# Patient Record
Sex: FEMALE | Race: WHITE | NOT HISPANIC OR LATINO | Employment: FULL TIME
[De-identification: names, ages, dates, MRNs, and addresses within clinical notes are randomized per-mention and may not be internally consistent; named-entity substitution may affect disease eponyms.]

---

## 2017-10-15 ENCOUNTER — HEALTH MAINTENANCE LETTER (OUTPATIENT)
Age: 42
End: 2017-10-15

## 2024-03-12 ENCOUNTER — HOSPITAL ENCOUNTER (EMERGENCY)
Facility: CLINIC | Age: 49
Discharge: PSYCHIATRIC OR SUBSTANCE USE FACILITY | End: 2024-03-13
Attending: EMERGENCY MEDICINE | Admitting: EMERGENCY MEDICINE
Payer: COMMERCIAL

## 2024-03-12 ENCOUNTER — TELEPHONE (OUTPATIENT)
Dept: BEHAVIORAL HEALTH | Facility: CLINIC | Age: 49
End: 2024-03-12

## 2024-03-12 DIAGNOSIS — E03.9 HYPOTHYROIDISM, UNSPECIFIED TYPE: ICD-10-CM

## 2024-03-12 DIAGNOSIS — F23 ACUTE PSYCHOSIS (H): ICD-10-CM

## 2024-03-12 PROBLEM — F31.9 BIPOLAR DISORDER (H): Status: ACTIVE | Noted: 2024-03-12

## 2024-03-12 LAB
ACANTHOCYTES BLD QL SMEAR: ABNORMAL
ALBUMIN SERPL BCG-MCNC: 3.9 G/DL (ref 3.5–5.2)
ALBUMIN UR-MCNC: NEGATIVE MG/DL
ALP SERPL-CCNC: 91 U/L (ref 40–150)
ALT SERPL W P-5'-P-CCNC: 12 U/L (ref 0–50)
AMPHETAMINES UR QL SCN: ABNORMAL
ANION GAP SERPL CALCULATED.3IONS-SCNC: 10 MMOL/L (ref 7–15)
APPEARANCE UR: CLEAR
AST SERPL W P-5'-P-CCNC: 35 U/L (ref 0–45)
AUER BODIES BLD QL SMEAR: ABNORMAL
BACTERIA #/AREA URNS HPF: ABNORMAL /HPF
BARBITURATES UR QL SCN: ABNORMAL
BASO STIPL BLD QL SMEAR: ABNORMAL
BASOPHILS # BLD AUTO: 0.1 10E3/UL (ref 0–0.2)
BASOPHILS NFR BLD AUTO: 1 %
BENZODIAZ UR QL SCN: ABNORMAL
BILIRUB SERPL-MCNC: 0.3 MG/DL
BILIRUB UR QL STRIP: NEGATIVE
BITE CELLS BLD QL SMEAR: ABNORMAL
BLISTER CELLS BLD QL SMEAR: ABNORMAL
BUN SERPL-MCNC: 10.2 MG/DL (ref 6–20)
BURR CELLS BLD QL SMEAR: ABNORMAL
BZE UR QL SCN: ABNORMAL
CALCIUM SERPL-MCNC: 9.1 MG/DL (ref 8.6–10)
CANNABINOIDS UR QL SCN: ABNORMAL
CHLORIDE SERPL-SCNC: 101 MMOL/L (ref 98–107)
COLOR UR AUTO: YELLOW
CREAT SERPL-MCNC: 1.14 MG/DL (ref 0.51–0.95)
DACRYOCYTES BLD QL SMEAR: SLIGHT
DEPRECATED HCO3 PLAS-SCNC: 26 MMOL/L (ref 22–29)
EGFRCR SERPLBLD CKD-EPI 2021: 59 ML/MIN/1.73M2
ELLIPTOCYTES BLD QL SMEAR: ABNORMAL
EOSINOPHIL # BLD AUTO: 0.3 10E3/UL (ref 0–0.7)
EOSINOPHIL NFR BLD AUTO: 2 %
ERYTHROCYTE [DISTWIDTH] IN BLOOD BY AUTOMATED COUNT: 19.9 % (ref 10–15)
FENTANYL UR QL: ABNORMAL
FRAGMENTS BLD QL SMEAR: ABNORMAL
GIANT PLATELETS BLD QL SMEAR: ABNORMAL
GLUCOSE SERPL-MCNC: 72 MG/DL (ref 70–99)
GLUCOSE UR STRIP-MCNC: NEGATIVE MG/DL
HCT VFR BLD AUTO: 33.1 % (ref 35–47)
HGB BLD-MCNC: 9.9 G/DL (ref 11.7–15.7)
HGB C CRYSTALS: ABNORMAL
HGB UR QL STRIP: NEGATIVE
HOWELL-JOLLY BOD BLD QL SMEAR: ABNORMAL
HYALINE CASTS: 1 /LPF
IMM GRANULOCYTES # BLD: 0 10E3/UL
IMM GRANULOCYTES NFR BLD: 0 %
KETONES UR STRIP-MCNC: NEGATIVE MG/DL
LEUKOCYTE ESTERASE UR QL STRIP: NEGATIVE
LYMPHOCYTES # BLD AUTO: 3.2 10E3/UL (ref 0–5.3)
LYMPHOCYTES NFR BLD AUTO: 28 %
MCH RBC QN AUTO: 24.9 PG (ref 26.5–33)
MCHC RBC AUTO-ENTMCNC: 29.9 G/DL (ref 31.5–36.5)
MCV RBC AUTO: 83 FL (ref 78–100)
MONOCYTES # BLD AUTO: 0.6 10E3/UL (ref 0–1.3)
MONOCYTES NFR BLD AUTO: 5 %
MUCOUS THREADS #/AREA URNS LPF: PRESENT /LPF
NEUTROPHILS # BLD AUTO: 7.3 10E3/UL (ref 1.6–8.3)
NEUTROPHILS NFR BLD AUTO: 64 %
NEUTS HYPERSEG BLD QL SMEAR: ABNORMAL
NITRATE UR QL: NEGATIVE
NRBC # BLD AUTO: 0 10E3/UL
NRBC BLD AUTO-RTO: 0 /100
OPIATES UR QL SCN: ABNORMAL
PATH REV: ABNORMAL
PCP QUAL URINE (ROCHE): ABNORMAL
PH UR STRIP: 6 [PH] (ref 5–7)
PLAT MORPH BLD: ABNORMAL
PLATELET # BLD AUTO: 290 10E3/UL (ref 150–450)
POLYCHROMASIA BLD QL SMEAR: ABNORMAL
POTASSIUM SERPL-SCNC: 3.8 MMOL/L (ref 3.4–5.3)
PROT SERPL-MCNC: 9.3 G/DL (ref 6.4–8.3)
RBC # BLD AUTO: 3.97 10E6/UL (ref 3.8–5.2)
RBC AGGLUT BLD QL: ABNORMAL
RBC MORPH BLD: ABNORMAL
RBC URINE: <1 /HPF
ROULEAUX BLD QL SMEAR: ABNORMAL
SARS-COV-2 RNA RESP QL NAA+PROBE: NEGATIVE
SICKLE CELLS BLD QL SMEAR: ABNORMAL
SMUDGE CELLS BLD QL SMEAR: ABNORMAL
SODIUM SERPL-SCNC: 137 MMOL/L (ref 135–145)
SP GR UR STRIP: 1.01 (ref 1–1.03)
SPHEROCYTES BLD QL SMEAR: ABNORMAL
SQUAMOUS EPITHELIAL: 4 /HPF
STOMATOCYTES BLD QL SMEAR: ABNORMAL
T4 FREE SERPL-MCNC: 0.21 NG/DL (ref 0.9–1.7)
TARGETS BLD QL SMEAR: ABNORMAL
TOXIC GRANULES BLD QL SMEAR: ABNORMAL
TSH SERPL DL<=0.005 MIU/L-ACNC: 231.9 UIU/ML (ref 0.3–4.2)
UROBILINOGEN UR STRIP-MCNC: 4 MG/DL
VARIANT LYMPHS BLD QL SMEAR: ABNORMAL
WBC # BLD AUTO: 11.5 10E3/UL (ref 4–11)
WBC URINE: 1 /HPF

## 2024-03-12 PROCEDURE — 36415 COLL VENOUS BLD VENIPUNCTURE: CPT | Performed by: EMERGENCY MEDICINE

## 2024-03-12 PROCEDURE — 80307 DRUG TEST PRSMV CHEM ANLYZR: CPT | Performed by: EMERGENCY MEDICINE

## 2024-03-12 PROCEDURE — 85004 AUTOMATED DIFF WBC COUNT: CPT | Performed by: EMERGENCY MEDICINE

## 2024-03-12 PROCEDURE — 99285 EMERGENCY DEPT VISIT HI MDM: CPT | Performed by: EMERGENCY MEDICINE

## 2024-03-12 PROCEDURE — 84439 ASSAY OF FREE THYROXINE: CPT | Performed by: EMERGENCY MEDICINE

## 2024-03-12 PROCEDURE — 87635 SARS-COV-2 COVID-19 AMP PRB: CPT | Performed by: EMERGENCY MEDICINE

## 2024-03-12 PROCEDURE — 80053 COMPREHEN METABOLIC PANEL: CPT | Performed by: EMERGENCY MEDICINE

## 2024-03-12 PROCEDURE — 84443 ASSAY THYROID STIM HORMONE: CPT | Performed by: EMERGENCY MEDICINE

## 2024-03-12 PROCEDURE — 250N000013 HC RX MED GY IP 250 OP 250 PS 637: Performed by: EMERGENCY MEDICINE

## 2024-03-12 PROCEDURE — 81001 URINALYSIS AUTO W/SCOPE: CPT | Mod: XU | Performed by: EMERGENCY MEDICINE

## 2024-03-12 RX ORDER — LORAZEPAM 1 MG/1
2 TABLET ORAL EVERY 8 HOURS PRN
Status: DISCONTINUED | OUTPATIENT
Start: 2024-03-12 | End: 2024-03-14 | Stop reason: HOSPADM

## 2024-03-12 RX ORDER — LEVOTHYROXINE SODIUM 150 UG/1
150 TABLET ORAL DAILY
COMMUNITY
Start: 2024-03-10 | End: 2025-03-10

## 2024-03-12 RX ORDER — LEVOTHYROXINE SODIUM 75 UG/1
225 TABLET ORAL
Status: DISCONTINUED | OUTPATIENT
Start: 2024-03-13 | End: 2024-03-14 | Stop reason: HOSPADM

## 2024-03-12 RX ORDER — LEVOTHYROXINE SODIUM 75 UG/1
150 TABLET ORAL ONCE
Status: COMPLETED | OUTPATIENT
Start: 2024-03-12 | End: 2024-03-12

## 2024-03-12 RX ORDER — LEVOTHYROXINE SODIUM 75 UG/1
150 TABLET ORAL
Status: DISCONTINUED | OUTPATIENT
Start: 2024-03-13 | End: 2024-03-12

## 2024-03-12 RX ORDER — OLANZAPINE 5 MG/1
10 TABLET, ORALLY DISINTEGRATING ORAL 2 TIMES DAILY PRN
Status: DISCONTINUED | OUTPATIENT
Start: 2024-03-12 | End: 2024-03-14 | Stop reason: HOSPADM

## 2024-03-12 RX ADMIN — AMOXICILLIN AND CLAVULANATE POTASSIUM 1 TABLET: 875; 125 TABLET, COATED ORAL at 15:35

## 2024-03-12 RX ADMIN — LEVOTHYROXINE SODIUM 150 MCG: 75 TABLET ORAL at 15:35

## 2024-03-12 ASSESSMENT — ACTIVITIES OF DAILY LIVING (ADL)
ADLS_ACUITY_SCORE: 35
ADLS_ACUITY_SCORE: 33
ADLS_ACUITY_SCORE: 35

## 2024-03-12 ASSESSMENT — COLUMBIA-SUICIDE SEVERITY RATING SCALE - C-SSRS
2. HAVE YOU ACTUALLY HAD ANY THOUGHTS OF KILLING YOURSELF IN THE PAST MONTH?: NO
1. IN THE PAST MONTH, HAVE YOU WISHED YOU WERE DEAD OR WISHED YOU COULD GO TO SLEEP AND NOT WAKE UP?: NO
6. HAVE YOU EVER DONE ANYTHING, STARTED TO DO ANYTHING, OR PREPARED TO DO ANYTHING TO END YOUR LIFE?: NO

## 2024-03-12 NOTE — MEDICATION SCRIBE - ADMISSION MEDICATION HISTORY
Medication Scribe Admission Medication History    Admission medication history is complete. The information provided in this note is only as accurate as the sources available at the time of the update.    Information Source(s): Patient, Patient's pharmacy, and CareEverywhere/[employer]Scripts via in-person    Pertinent Information: patient just picked up new dose of  levothyroxine yesterday, took one this morning. Was on 200mcg + 25mcg daily last dispensed 01/05/2024 90/90 days on both. Verified with shannan Perdomo at [place], [place]    Changes made to PTA medication list:  Added: augmentin, levothyroxine  Deleted: None  Changed: None    Allergies reviewed with patient and updates made in EHR: no    Medication History Completed By: GLORIA BOLAND 3/12/2024 1:45 PM    PTA Med List   Medication Sig Note Last Dose    amoxicillin-clavulanate (AUGMENTIN) 875-125 MG tablet Take 1 tablet by mouth 2 times daily Cat bite  3/11/2024 at pm    levothyroxine (SYNTHROID/LEVOTHROID) 150 MCG tablet Take 150 mcg by mouth daily 3/12/2024: Just restarted 03/11/2024 3/12/2024 at am

## 2024-03-12 NOTE — PLAN OF CARE
Zoya RAMONE Rekha Arechiga  March 12, 2024  Plan of Care Hand-off Note     Patient Care Path: inpatient mental health    Plan for Care:   Pt presents to ED against her will due to family's concern of altered mental status.  For past week, family notes pt is somewhat confused and having delusional thougths.  Family reports immediate concern for pt's safety and ability to care for self.  Currently, pt is alert, oriented, cooperative with interview.  Pt denies SI or HI with no hx of inpatient hospitalizations.  Pt endorses mild paranoia and delusional thoughts, sadness, change in sleep and appetite. Pt without history of suicide attempts or harming others.   Pt verbalizes willingness for outpatient mental health supports.  Pt able to identify supports, crisis resources.  Pt is goal oriented.  After therapeutic assessment, intervention and aftercare planning by ED care team and LM and in consultation with attending provider, pt is recommended for IP MH due to concerns expressed for pt's immediate safety and inability to care for self by family in addition to pt's acute mental state of paranoia and delusions.  Central Intake contacted.    Identified Goals and Safety Issues: pt requiring IP for safety and stabilization    Overview:  Sister Melissa Muñiz #[phone]            Legal Status:      Psychiatry Consult:        Updated attending provider regarding plan of care.           Мария Salazar

## 2024-03-12 NOTE — Clinical Note
I have reviewed discharge instructions with the patient. The patient verbalized understanding. Discharge medications reviewed with patient and appropriate educational materials and side effects teaching were provided. Patient armband removed and shredded Zoya Arechiga was seen and treated in our emergency department on 3/12/2024.  She may return to work on 03/20/2024.  Currently under care of of physician and multidisciplinary specialty team.  Please excuse from any work duties until cleared by this team to return     If you have any questions or concerns, please don't hesitate to call.      Neris Crockett, DO

## 2024-03-12 NOTE — PROGRESS NOTES
Pt is tearful during DEC assessment, and states she is angry post assessment. Offered water, snack or warm blanket; patient states she wants nothing except to go home. Celina Shipley RN on 3/12/2024 at 6:27 PM

## 2024-03-12 NOTE — CONSULTS
"Diagnostic Evaluation Consultation  Crisis Assessment    Patient Name: Zoya Arechiga  Age:  48 year old  Legal Sex: female  Gender Identity: female  Pronouns:   Race: White  Ethnicity: Not  or   Language: English      Patient was assessed: Virtual: [place] Crisis Assessment Start Time: 1643 Crisis Assessment Stop Time: 1743  Patient location: [facility] EMERGENCY DEPT                             ED02    Referral Data and Chief Complaint  Zoya Arechiga presents to the ED via EMS. Patient is presenting to the ED for the following concerns: Paranoia, Worsening psychosocial stress, Significant behavioral change.   Factors that make the mental health crisis life threatening or complex are:  Pt brought to ED due to concerns of altered mental status.  Per family, pt has been confused with erratic behavior.  Pt expressing delusional and paranoid thoughts about drone following her, others listening to her.   Family concerned pt needing prompting to eat.  Pt lives alone, has had episodes of Bipolar in past.  Pt not on medication.   Pt denies SI or HI.  Pt is coopeative with interview, speech is clear.  Thoughts are tangential.  Pt states her family is aggressive and that she is trying to deal with her own personal issues by herself.  Pt notes she became \"ultrasensitive\" after receiving an acupuncture treatment a week ago and now is feeling every muscle and fiber in her body and \"I have a clarity I've never had\".  Pt endorses sadness, some feelings of hopelessness.  Pt reports decrease in sleep and appetite..      Informed Consent and Assessment Methods  Explained the crisis assessment process, including applicable information disclosures and limits to confidentiality, assessed understanding of the process, and obtained consent to proceed with the assessment.  Assessment methods included conducting a formal interview with patient, review of medical records, collaboration with " medical staff, and obtaining relevant collateral information from family and community providers when available.  : done     Patient response to interventions: acceptance expressed  Coping skills were attempted to reduce the crisis:  Pt feeling forced to come to hospital against her will, states she was going to reach out to a primary doctor     History of the Crisis   Pt reports 3 prior episodes of similar symptoms.  Pt has been to ER for evaluations in past and placed on72 hour hold. Pt denies prior hospitalizations.  Family brought pt to ER in [place] over weekend but pt was discharged after refusing to stay voluntarily.    Brief Psychosocial History  Family:  Single, Children yes  Support System:  Sibling(s), Children  Employment Status:  disabled  Source of Income:  salary/wages  Financial Environmental Concerns:  other (see comments) (unable to fully assess)  Current Hobbies:  interaction with pets  Barriers in Personal Life:  mental health concerns    Significant Clinical History  Current Anxiety Symptoms:  anxious  Current Depression/Trauma:  difficulty concentrating, impaired decision making, irritable, hopelessness, sadness  Current Somatic Symptoms:  anxious  Current Psychosis/Thought Disturbance:  agitation, tactile hallucinations  Current Eating Symptoms:  loss of appetite  Chemical Use History:  Alcohol: None  Benzodiazepines: None  Opiates: None  Cocaine: None  Marijuana: Occasional   Past diagnosis:  Bipolar Disorder  Family history:  Depression  Past treatment:  Individual therapy  Details of most recent treatment:  Pt is unsure or unwilling to share if has been on psychiatric meds in past  Other relevant history:          Collateral Information  Is there collateral information: Yes     Collateral information name, relationship, phone number:  Melissa Muñiz  [phone]    What happened today: think sister is in a Manic phase since last week, behavior:  needing redirection for simple tasks like  "eating, talking of ex-boyfriend's son who has been trained in drone survellience afraid she is being watched/listened to, telling others to not speak when phone is by, complaining of experiencing sensations like \"heaviness in foot\".     What is different about patient's functioning: confusion, behavior change,  was in ER in [facility] over weekend for assmt but discharged.     Concern about alcohol/drug use:      What do you think the patient needs:      Has patient made comments about wanting to kill themselves/others: no    If d/c is recommended, can they take part in safety/aftercare planning:  yes    Additional collateral information:  3 years homeless, first incident age 17 - thought she was [employer] agent, age 21 - police called and child put in CPS, over weekend was assessed in [place] but discharged.  Stable for past 18 months working.  Adult son brought to see her past week, it did not go well, she was able to recognize and assist him in finding housing.  Uncle and cousin with mental health issues.  Ocassional use of marijuana.     Risk Assessment  [place] Suicide Severity Rating Scale Full Clinical Version:  Suicidal Ideation  Q6 Suicide Behavior (Lifetime): no          [place] Suicide Severity Rating Scale Recent:   Suicidal Ideation (Recent)  Q1 Wished to be Dead (Past Month): no  Q2 Suicidal Thoughts (Past Month): no  Level of Risk per Screen: no risks indicated          Environmental or Psychosocial Events: other life stressors, work or task failure  Protective Factors: Protective Factors: responsibilities and duties to others, including pets and children, reality testing ability    Does the patient have thoughts of harming others? Feels Like Hurting Others: no  Previous Attempt to Hurt Others: no  Current presentation: Confused  Is the patient engaging in sexually inappropriate behavior?: no  Duty to warn initiated: no    Is the patient engaging in sexually inappropriate behavior?  no        Mental Status Exam "   Affect: Appropriate  Appearance: Disheveled  Attention Span/Concentration: Attentive  Eye Contact: Variable    Fund of Knowledge: Appropriate   Language /Speech Content: Fluent  Language /Speech Volume: Normal  Language /Speech Rate/Productions: Normal  Recent Memory: Intact  Remote Memory: Intact  Mood: Irritable, Depressed  Orientation to Person: Yes   Orientation to Place: Yes  Orientation to Time of Day: Yes  Orientation to Date: Yes     Situation (Do they understand why they are here?): Yes  Psychomotor Behavior: Normal  Thought Content: Delusions, Paranoia  Thought Form: Paranoia, Tangential     Mini-Cog Assessment  Number of Words Recalled:    Clock-Drawing Test:     Three Item Recall:    Mini-Cog Total Score:       Medication  Psychotropic medications:   Medication Orders - Psychiatric (From admission, onward)      None             Current Care Team  Patient Care Team:  No Ref-Primary, Physician as PCP - General    Diagnosis  Patient Active Problem List   Diagnosis Code    Bipolar disorder (H) F31.9       Primary Problem This Admission  Active Hospital Problems    *Bipolar disorder (H)        Clinical Summary and Substantiation of Recommendations   Pt presents to ED against her will due to family's concern of altered mental status.  For past week, family notes pt is somewhat confused and having delusional thougths.  Family reports immediate concern for pt's safety and ability to care for self.  Currently, pt is alert, oriented, cooperative with interview.  Pt denies SI or HI with no hx of inpatient hospitalizations.  Pt endorses mild paranoia and delusional thoughts, sadness, change in sleep and appetite. Pt without history of suicide attempts or harming others.   Pt verbalizes willingness for outpatient mental health supports.  Pt able to identify supports, crisis resources.  Pt is goal oriented.  After therapeutic assessment, intervention and aftercare planning by ED care team and [facility] and in  consultation with attending provider, pt is recommended for [facility] due to concerns expressed for pt's immediate safety and inability to care for self by family in addition to pt's acute mental state of paranoia and delusions.  Central Intake contacted.          Severe psychiatric, behavioral or other comorbid conditions are appropriate for management at inpatient mental health as indicated by at least one of the following: Psychiatric Symptoms, Impaired impulse control, judgement, or insight  Severe dysfunction in daily living is present as indicated by at least one of the following: Complete inability to maintain any appropriate aspect of personal responsibility in any adult roles, Other evidence of severe dysfunction  Situation and expectations are appropriate for inpatient care: Patient is unwilling to participate in treatment voluntarily and requires treatment  Inpatient mental health services are necessary to meet patient needs and at least one of the following: Specific condition related to admission diagnosis is present and judged likely to further improve at proposed level of care      Patient coping skills attempted to reduce the crisis:  Pt feeling forced to come to hospital against her will, states she was going to reach out to a primary doctor    Disposition  Recommended disposition: Individual Therapy, Medication Management        Reviewed case and recommendations with attending provider. Attending Name: Dr Ordonez       Attending concurs with disposition: no       Patient and/or validated legal guardian concurs with disposition:   no       Final disposition:  inpatient mental health    Legal status on admission:      Assessment Details   Total duration spent with the patient: 60 min     CPT code(s) utilized: 80722 - Psychotherapy for Crisis - 60 (30-74*) min    Мария Salazar Psychotherapist  DEC - Triage & Transition Services  Callback: [phone]

## 2024-03-12 NOTE — ED PROVIDER NOTES
History     Chief Complaint   Patient presents with    Altered Mental Status     HPI  Zoya Arechiga is a 48 year old female who is brought in here by her sister and daughter.  I had an independent interview with the sister who stated she is delusional and psychotic.  Sister reports she has a diagnosis of bipolar disorder but refuses to take any medications.  Also does not feel she is taking any drugs or alcohol.  She apparently lives in a trailer in [place] with no water.  Over the last week, sister reports she was out walking around the trailer area picking things from neighbors yard and putting them on [place].  She was brought into the hospital in [place] 2 days ago.  Her thyroid apparently was off the chart as she had not been taking her thyroid replacement medication.  They replaced this in [place] to a voluntary psych unit for which she refused to go inside.  She lives in [facility].  She then drove to her daughters here locally.  Last night she was tangential, incoherent and delusional.  She stated people were listening to her.   they feel she is unsafe.  They called the police to have her brought into the car.  Police did not touch her but help escort her into the car.  Upon getting here, they were fearful she would run into traffic till she eventually came in here.  Patient denies she is having trouble and states she is just having a midlife crisis      Allergies:  Allergies   Allergen Reactions    Azithromycin Hives       Problem List:    Patient Active Problem List    Diagnosis Date Noted    Bipolar disorder (H) 03/12/2024     Priority: Medium        Past Medical History:    No past medical history on file.    Past Surgical History:    No past surgical history on file.    Family History:    No family history on file.    Social History:  Marital Status:  Single [1]        Medications:    amoxicillin-clavulanate (AUGMENTIN) 875-125 MG tablet  levothyroxine (SYNTHROID/LEVOTHROID) 150 MCG  tablet          Review of Systems  Scratch to her left arm from a cat tooth that is being treated with antibiotics.  No other injury or medical concern reported than the thyroid condition  Physical Exam   BP: (!) 120/90  Pulse: 91  Temp: 97.7  F (36.5  C)  Resp: 20  Weight: 116.6 kg (257 lb)  SpO2: 99 %      Physical Exam  Vitals and nursing note reviewed.   Constitutional:       General: She is not in acute distress.     Appearance: She is well-developed. She is not diaphoretic.   HENT:      Head: Normocephalic and atraumatic.      Nose: Nose normal.      Mouth/Throat:      Mouth: Mucous membranes are moist.      Pharynx: Oropharynx is clear.   Eyes:      General: No scleral icterus.     Conjunctiva/sclera: Conjunctivae normal.   Cardiovascular:      Rate and Rhythm: Normal rate and regular rhythm.      Heart sounds: Normal heart sounds. No murmur heard.  Pulmonary:      Effort: Pulmonary effort is normal. No respiratory distress.      Breath sounds: No stridor. No wheezing or rales.   Abdominal:      General: Abdomen is flat. There is no distension.      Palpations: Abdomen is soft. There is no mass.      Tenderness: There is no abdominal tenderness. There is no guarding or rebound.   Musculoskeletal:         General: No tenderness.      Cervical back: Normal range of motion and neck supple.   Skin:     General: Skin is warm and dry.      Coloration: Skin is not pale.      Findings: No erythema or rash.   Neurological:      General: No focal deficit present.      Mental Status: She is alert.   Psychiatric:         Mood and Affect: Mood normal. Affect is blunt.         Speech: Speech is tangential.         Thought Content: Thought content does not include homicidal or suicidal ideation.         Cognition and Memory: Cognition is impaired.         ED Course        Procedures                Results for orders placed or performed during the hospital encounter of 03/12/24 (from the past 24 hour(s))   CBC with platelets  differential    Narrative    The following orders were created for panel order CBC with platelets differential.  Procedure                               Abnormality         Status                     ---------                               -----------         ------                     CBC with platelets and d...[[id number]]  Abnormal            Final result               RBC and Platelet Morphology[[id number]]  Abnormal            Final result                 Please view results for these tests on the individual orders.   Comprehensive metabolic panel   Result Value Ref Range    Sodium 137 135 - 145 mmol/L    Potassium 3.8 3.4 - 5.3 mmol/L    Carbon Dioxide (CO2) 26 22 - 29 mmol/L    Anion Gap 10 7 - 15 mmol/L    Urea Nitrogen 10.2 6.0 - 20.0 mg/dL    Creatinine 1.14 (H) 0.51 - 0.95 mg/dL    GFR Estimate 59 (L) >60 mL/min/1.73m2    Calcium 9.1 8.6 - 10.0 mg/dL    Chloride 101 98 - 107 mmol/L    Glucose 72 70 - 99 mg/dL    Alkaline Phosphatase 91 40 - 150 U/L    AST 35 0 - 45 U/L    ALT 12 0 - 50 U/L    Protein Total 9.3 (H) 6.4 - 8.3 g/dL    Albumin 3.9 3.5 - 5.2 g/dL    Bilirubin Total 0.3 <=1.2 mg/dL   TSH with free T4 reflex   Result Value Ref Range    .90 (H) 0.30 - 4.20 uIU/mL   CBC with platelets and differential   Result Value Ref Range    WBC Count 11.5 (H) 4.0 - 11.0 10e3/uL    RBC Count 3.97 3.80 - 5.20 10e6/uL    Hemoglobin 9.9 (L) 11.7 - 15.7 g/dL    Hematocrit 33.1 (L) 35.0 - 47.0 %    MCV 83 78 - 100 fL    MCH 24.9 (L) 26.5 - 33.0 pg    MCHC 29.9 (L) 31.5 - 36.5 g/dL    RDW 19.9 (H) 10.0 - 15.0 %    Platelet Count 290 150 - 450 10e3/uL    % Neutrophils 64 %    % Lymphocytes 28 %    % Monocytes 5 %    % Eosinophils 2 %    % Basophils 1 %    % Immature Granulocytes 0 %    NRBCs per 100 WBC 0 <1 /100    Absolute Neutrophils 7.3 1.6 - 8.3 10e3/uL    Absolute Lymphocytes 3.2 0.0 - 5.3 10e3/uL    Absolute Monocytes 0.6 0.0 - 1.3 10e3/uL    Absolute Eosinophils 0.3 0.0 - 0.7 10e3/uL    Absolute  Basophils 0.1 0.0 - 0.2 10e3/uL    Absolute Immature Granulocytes 0.0 <=0.4 10e3/uL    Absolute NRBCs 0.0 10e3/uL   RBC and Platelet Morphology   Result Value Ref Range    RBC Morphology Confirmed RBC Indices     Platelet Assessment  Automated Count Confirmed. Platelet morphology is normal.     Automated Count Confirmed. Platelet morphology is normal.    Giant Platelets      Acanthocytes      Leandro Rods      Basophilic Stippling      Bite Cells      Blister Cells      [facility] Cells      Elliptocytes      Hgb C Crystals      Lawrence-Jolly Bodies      Hypersegmented Neutrophils      Polychromasia      RBC agglutination      RBC Fragments      Reactive Lymphocytes      Rouleaux      Sickle Cells      Smudge Cells      Spherocytes      Stomatocytes      Target Cells      Teardrop Cells Slight (A) None Seen    Toxic Neutrophils      Pathologist Review Comments (Blood)     T4 free   Result Value Ref Range    Free T4 0.21 (L) 0.90 - 1.70 ng/dL   UA with Microscopic reflex to Culture    Specimen: Urine, Clean Catch   Result Value Ref Range    Color Urine Yellow Colorless, Straw, Light Yellow, Yellow    Appearance Urine Clear Clear    Glucose Urine Negative Negative mg/dL    Bilirubin Urine Negative Negative    Ketones Urine Negative Negative mg/dL    Specific Gravity Urine 1.013 1.003 - 1.035    Blood Urine Negative Negative    pH Urine 6.0 5.0 - 7.0    Protein Albumin Urine Negative Negative mg/dL    Urobilinogen Urine 4.0 (A) Normal, 2.0 mg/dL    Nitrite Urine Negative Negative    Leukocyte Esterase Urine Negative Negative    Bacteria Urine Few (A) None Seen /HPF    Mucus Urine Present (A) None Seen /LPF    RBC Urine <1 <=2 /HPF    WBC Urine 1 <=5 /HPF    Squamous Epithelials Urine 4 (H) <=1 /HPF    Hyaline Casts Urine 1 <=2 /LPF    Narrative    Urine Culture not indicated   Urine Drug Screen    Narrative    The following orders were created for panel order Urine Drug Screen.  Procedure                                Abnormality         Status                     ---------                               -----------         ------                     Urine Drug Screen Panel[[id number]]      Abnormal            Final result                 Please view results for these tests on the individual orders.   Urine Drug Screen Panel   Result Value Ref Range    Amphetamines Urine Screen Negative Screen Negative    Barbituates Urine Screen Negative Screen Negative    Benzodiazepine Urine Screen Negative Screen Negative    Cannabinoids Urine Screen Positive (A) Screen Negative    Cocaine Urine Screen Negative Screen Negative    Fentanyl Qual Urine Screen Negative Screen Negative    Opiates Urine Screen Negative Screen Negative    PCP Urine Screen Negative Screen Negative   Symptomatic COVID-19 Virus (Coronavirus) by PCR Nose    Specimen: Nose; Swab   Result Value Ref Range    SARS CoV2 PCR Negative Negative    Narrative    Testing was performed using the Green Cleanert Xpress SARS-CoV-2 Assay on the Cepheid Gene-Xpert Instrument Systems. Additional information about this Emergency Use Authorization (EUA) assay can be found via the Lab Guide. This test should be ordered for the detection of SARS-CoV-2 in individuals who meet SARS-CoV-2 clinical and/or epidemiological criteria as well as from individuals without symptoms or other reasons to suspect COVID-19. Test performance for asymptomatic patients has only been established in anterior nasal swab specimens. This test is for in vitro diagnostic use under the FDA EUA for laboratories certified under CLIA to perform high complexity testing. This test has not been FDA cleared or approved. A negative result does not rule out the presence of PCR inhibitors in the specimen or target RNA concentration below the limit of detection for the assay. The possibility of a false negative should be considered if the patient's recent exposure or clinical presentation suggests COVID-19. This test was validated by the [facility]  [facility] and [facility]. This laboratory is certified under the Clinical Laboratory Improvement Amendments (CLIA) as qualified to perform high complexity laboratory testing.         Medications   amoxicillin-clavulanate (AUGMENTIN) 875-125 MG per tablet 1 tablet (has no administration in time range)   levothyroxine (SYNTHROID/LEVOTHROID) tablet 225 mcg (has no administration in time range)   OLANZapine zydis (zyPREXA) ODT tab 10 mg (has no administration in time range)   LORazepam (ATIVAN) tablet 2 mg (has no administration in time range)   amoxicillin-clavulanate (AUGMENTIN) 875-125 MG per tablet 1 tablet (1 tablet Oral $Given 3/12/24 1535)   levothyroxine (SYNTHROID/LEVOTHROID) tablet 150 mcg (150 mcg Oral $Given 3/12/24 1535)       Assessments & Plan (with Medical Decision Making)  48-year-old female with history of bipolar disorder unmedicated who presents with acute psychosis.  Appears unsafe and at potential risk for self.  Placed on a hold.  Also with incidental finding of hypothyroidism.  She states she has just not been able to find time to take her medications.  Last medication dose was 225 mcg of levothyroxine in [place] found in early January.  She did fill this prescription for 90 days worth and should have enough.  However TSH is 321 and free T4 is 0.2.  I discussed the case in consultation with endocrinology, Dr. Burgos, who did not feel her psychosis likely correlated with her hypothyroidism.  She is seen by DEC.  They are in the process of trying to find placement for her.  She will be signed out to Dr. Newberry, on a 1:1 watch.  Health officer watch had been placed.  Anticiptate will convert to a 72-hour hold if she tries to elope.     I have reviewed the nursing notes.    I have reviewed the findings, diagnosis, plan and need for follow up with the patient.        New Prescriptions    No medications on file       Final diagnoses:   Acute psychosis (H)    Hypothyroidism, unspecified type       3/12/2024   [facility] EMERGENCY DEPT       Shaun Ordonez MD  03/12/24 2017       Shaun Ordonez MD  03/12/24 [id number]

## 2024-03-12 NOTE — ED NOTES
Brought in by sister who reports concern over mental health, pt her against her will but cooperating with RN. Refused blood draw stating she had a recent blood draw in [place] which was confirmed. Belongings locked up-awaiting DEC . Sister and daughter in lobby per patient wishes to not have them in room at this time

## 2024-03-13 ENCOUNTER — TELEPHONE (OUTPATIENT)
Dept: BEHAVIORAL HEALTH | Facility: CLINIC | Age: 49
End: 2024-03-13
Payer: COMMERCIAL

## 2024-03-13 VITALS
OXYGEN SATURATION: 99 % | TEMPERATURE: 97.7 F | DIASTOLIC BLOOD PRESSURE: 73 MMHG | RESPIRATION RATE: 18 BRPM | SYSTOLIC BLOOD PRESSURE: 119 MMHG | HEART RATE: 74 BPM | WEIGHT: 257 LBS

## 2024-03-13 PROCEDURE — 250N000013 HC RX MED GY IP 250 OP 250 PS 637: Performed by: EMERGENCY MEDICINE

## 2024-03-13 RX ORDER — NICOTINE 21 MG/24HR
1 PATCH, TRANSDERMAL 24 HOURS TRANSDERMAL DAILY
Status: DISCONTINUED | OUTPATIENT
Start: 2024-03-13 | End: 2024-03-14 | Stop reason: HOSPADM

## 2024-03-13 RX ADMIN — AMOXICILLIN AND CLAVULANATE POTASSIUM 1 TABLET: 875; 125 TABLET, COATED ORAL at 09:34

## 2024-03-13 RX ADMIN — LEVOTHYROXINE SODIUM 225 MCG: 75 TABLET ORAL at 06:56

## 2024-03-13 NOTE — TELEPHONE ENCOUNTER
R: [place]  Access Inpatient Bed Call Log 3/13/24 @ 12:20AM:      Intake has called facilities that have not updated the bed status within the last 12 hours.         *[facility]               [facility] is at capacity            [facility] is posting 0 beds. [phone]    [facility] is posting 0 beds. Negative covid required              [facility] is posting 0 beds. Neg covid. No high school/[facility]-psych. [phone]   [facility] is posting 0 beds. [phone]   [facility] is posting 0 beds. [phone]    [facility] is posting 1 bed for Young Adults. Negative covid. [phone]. Per Deepo @ 12:21AM, they are at full capacity   [facility] is posting 0 beds           [facility] is posting 0 beds [phone]   [facility] is posting 0 beds. LOW acuity ONLY. Mixed unit 12+. Negative covid- [phone]   [facility] has 0 beds posted. No aggression. Negative Covid. Low acuity    [facility] is posting 0 beds. Negative covid. [phone]    [facility] is posting 0 beds. Low acuity only. Neg covid.  [phone]    [facility] is posting 3 beds. Low acuity. No current aggression. Per Nery @ 12:25AM, they are full    [facility] is posting 5 beds available. Negative covid.  [phone]. Per Rainer @ 12:30AM, only low acuity beds and must be voluntary patients     [facility] is posting 0 beds. Low acuity. 72 HH hold preferred. Negative covid required. [phone]   [facility] is posting 6 beds. Low acuity only. Neg covid.  [phone]. Per Rainer @ 12:30AM, few beds but they are reviewing multiple referrals   [facility] [place] is posting 4 beds.  Negative covid required.   Vol only, No history of aggression, violence, or assault. No sexual offenders. No 72 HH holds. [phone]    [facility] is posting 3 beds.  Negative covid required.  (Must have the cognitive ability to do programming. No aggressive or violent behavior or recent HX in the last 2 yrs. MH must be primary.) Always low acuity. [phone]    [facility] has 0 beds posted. Negative covid required.  Low acuity only. Violence and aggression capped.  [phone]    [facility] is posting 2 beds. Low acuity, Negative covid required. [phone]. Per Francoise @ 12:34AM, they are full    [facility] [place] posting 0 beds Negative covid required.  [phone]    [facility] [place] is posting 2 beds. Negative covid. LOW acuity. (No lines, drains, or tubes, oxygen, CPAP, IV, etc.) Must Have a Ride Home. [phone]. Per Tammy @ 12:35AM, 2 beds available    [facility] is posting 2 beds. Negative covid. (No. lines, drains, or tubes, oxygen, CPAP, IV, etc.). [phone]. Per staff @ 12:36AM, no high acuity beds; Only low acuity on general/mixed unit    [facility] is posting 6 beds. No covid test required. [phone] [place] of state       Pt remains on the work list pending appropriate bed availability.

## 2024-03-13 NOTE — PROGRESS NOTES
Pt is getting verbally aggressive to her visitors and more animated with her body language. Writer advised that if patient is getting more upset then visitors will be asked to leave. Pt is blaming her sister for her being here. Celina Shipley RN on 3/12/2024 at 7:48 PM

## 2024-03-13 NOTE — PROGRESS NOTES
IP MH Referral Acuity Rating Score (RARS)     LMHP complete at referral to IP MH, with DEC; and, daily while awaiting IP MH placement. Call score to PPS.  CRITERIA SCORING   New 72 HH and Involuntary for IP MH (not adolescent) 1/1   Boarding over 24 hours 1/1   Vulnerable adult at least 55+ with multiple co morbidities; or, Patient age 11 or under 0/1   Suicide ideation without relief of precipitating factors 0/1   Current plan for suicide 0/1   Current plan for homicide 0/1   Imminent risk or actual attempt to seriously harm another without relief of factors precipitating the attempt 1/1   Severe dysfunction in daily living (ex: complete neglect for self care, extreme disruption in vegetative function, extreme deterioration in social interactions) 1/1   Recent (last 2 weeks) or current physical aggression in the ED 0/1   Restraints or seclusion episode in ED 0/1   Verbal aggression, agitation, yelling, etc., while in the ED 0/1   Active psychosis with psychomotor agitation or catatonia 1/1   Need for constant or near constant redirection (from leaving, from others, etc).  0/1   Intrusive or disruptive behaviors 0/1   TOTAL Acuity Total Score: 5

## 2024-03-13 NOTE — PROGRESS NOTES
Attempted supportive therapeutic telehealth visit today.      Telehealth cart was unavailable at this time.     Will attempt again later today.     KELLE SWEENEY

## 2024-03-13 NOTE — ED NOTES
Patient is upset because her room doesn't have a shower and other things she wants. Talked to patient about things we can do for her but patient wants to be at a different hospital because she was force to be here. Patient states wants nurse to call her daughter to come here so she can help her out. Told patient that she can't have her phone because [facility] policy. Patient not satisfied about option. Talk to provider to go speak with patient at this time.

## 2024-03-13 NOTE — ED NOTES
Spoke with sister-states she will talk to pt's daughter and see if she will bring fresh clothes. Will re-offer a shower when pt has access to clean clothes. Sister does not feel visiting will be helpful as it agitates pt even more

## 2024-03-13 NOTE — PROGRESS NOTES
"Triage & Transition Services, Extended Care     Therapy Progress Note    Patient: Zoya goes by \"Zoya,\" uses she/her pronouns  Date of Service: March 13, 2024  Site of Service: [facility] EMERGENCY DEPT                             ED02  Patient was seen yes  Mode of Assessment: Virtual: [employer]    Presentation Summary: Writer met with the patient via telehealth today.  She was initially sleeping and staff woke her to participate in care and assessment.  She was fixated initially on the color of this Writer's pen (blue pen) and that she believe that if she and the Writer did not move they would be able to speak telepathically.  She reports \"Do you hear what I am saying and moves her hands back and forth to the camera without usign verbal communication.  She believes throughout the interaction that she is able to control Writer with her mind and is making Writer write things down or \"not blink\" or \"don't move your head\".  She notes she has not been taking her thyroid medication and that when she had accupunture last week \"that is what has made me this way\". She then screams \"GET IN HERE! SHE CANT MOVE. SHE CANT MOVE.\" Staff enter the room and she communicates that she is unable to move her body arms or legs. She has her hand up on her head as if she where saluting.  Later on she has her arm straight out from her body for the remainder of today's interaction.  She repeats that between the thyroid medication noncompliance, the accupuncture, overstimulation of the body, lack of sleep for 24 hours and increased stress on \"my physical body\".  She at times chest breathing and slightly beings to hyperventilate and then calms her breathing within 15 seconds.  She is agreeable to medical care for her thyroid and is currently disagreeable to psychiatric care noting that \"I do not have bipolar disorder\" and shes does not believe she needs medicatons or mental health care.    Therapeutic Intervention(s) Provided: " Taught the link between thoughts, feelings, and behaviors., Introduced and explored accumulating positives.    Current Symptoms:   impaired decision making, irritable   impulsive, hyperverbal, displaces blame, forgetful, psychomotor retardation loss of appetite    Mental Status Exam   Affect: Dramatic, Labile  Appearance: Disheveled  Attention Span/Concentration: Attentive  Eye Contact: Intense    Fund of Knowledge: Appropriate   Language /Speech Content: Fluent  Language /Speech Volume: Normal  Language /Speech Rate/Productions: Pressured, Hyperverbal  Recent Memory: Variable  Remote Memory: Variable  Mood: Irritable  Orientation to Person: Yes   Orientation to Place: Yes  Orientation to Time of Day: Yes  Orientation to Date: Yes     Situation (Do they understand why they are here?): No  Psychomotor Behavior: Hyperactive  Thought Content: Delusions, Paranoia  Thought Form: Paranoia, Tangential    Treatment Objective(s) Addressed: processing feelings, assessing safety, identifying treatment goals, identifying additional supports, exploring obstacles to safety in the community    Patient Response to Interventions: unacceptance expressed, needs reinforcement    Progress Towards Goals: Patient Reports Symptoms Are: ongoing  Patient Progress Toward Goals: is not making progress  Comment: Continues to decline medications  Next Step to Work Toward Discharge: patient ability to engage in safety planning, follow up on referrals  Ability to Engage Comment: Continue to encourage patient to participate in care and assessment. Continue to encourage medication compliance.  Symptom Stabilization Comment: Continue to recommend IP mental health hospitalization.    Case Management: Case Management Included: completing or following up on referrals  Details on completing or following up on referrals: [facility] Patient Placement [phone]  Summary of Interaction: Updated legal status and RARS to patient placement  team.    Plan: inpatient mental health  yes provider, RN positive for marijuana  no (Updated Dr. Crockett, whom reports until patient physically attempts to leave the ED their goal is to have the patient remains voluntary in legal status.)    Clinical Substantiation: Pt presents to ED against her will due to family's concern of altered mental status. For past week, family notes pt is somewhat confused and having delusional thougths. Family reports immediate concern for pt's safety and ability to care for self. Currently, pt is alert, oriented, cooperative with interview. Pt denies SI or HI with no hx of inpatient hospitalizations. Pt endorses mild paranoia and delusional thoughts, sadness, change in sleep and appetite. Pt without history of suicide attempts or harming others. Pt verbalizes willingness for outpatient mental health supports. Pt able to identify supports, crisis resources. Pt is goal oriented. After therapeutic assessment, intervention and aftercare planning by ED care team and LM and in consultation with attending provider, pt is recommended for IP MH due to concerns expressed for pt's immediate safety and inability to care for self by family in addition to pt's acute mental state of paranoia and delusions. Central Intake contacted. She continues to present as paranoid and delusional in the Emergency Room.    Legal Status: Legal Status at Admission: Voluntary/Patient has signed consent for treatment    Session Status: Time session started: 1311  Time session ended: 1331  Session Duration (minutes): 20 minutes  Session Number: 1  Anticipated number of sessions or this episode of care: 4  Date of most recent diagnostic assessment:  (None)    Time Spent: 20 minutes    CPT Code: CPT Codes: 68206 - Psychotherapy (with patient) - 30 (16-37*) min    Diagnosis:   Patient Active Problem List   Diagnosis Code    Bipolar disorder (H) F31.9       Primary Problem This Admission: Active Hospital Problems     *Bipolar disorder (H)        KELLE SWEENEY   Licensed Mental Health Professional (LMHP), [facility]  [phone]

## 2024-03-13 NOTE — ED NOTES
Resumed care for a 48-year-old female presenting with an acute psychosis.  Presented with her daughter and sisters due to concerns of medical noncompliance and worsening behavior.  Patient is not suicidal or homicidal.  She denies this repeatedly through various evaluations throughout the evening.  She is continuously noted that she would like to be discharged if she is not to be admitted anywhere at any location.  With recurrent attempts to redirect patient noted that it takes time to have patient's transferred to available locations per patient specific criteria patient becomes aggravated.  Attempted to notify daughter this morning to help see if providing further family support may improve patient's compliance at this time.  She has not been aggressive at any time but has been agitated throughout the evening.  She did not sleep throughout the evening.  She refused all medications.  Was able to redirect patient enough to note that we will try to reach out to her daughter again this morning.  At this time reading the initial physician that patient was moderately psychotic and likely a harm to herself and should be placed under a hold if patient attempts to elope.    Was able to discuss her case with her daughter as well as her sister this morning and they continue to believe patient is a danger to herself and noncompliant with her medications.  She was open to taking her thyroid medication as antibiotics this morning.  I was able to order some breakfast as well.  Noted that her daughter may have come visit her this morning after breakfast to help alleviate agitation and provide support.  Discussed case with oncoming physician Dr. Crockett who will continue care.     Sami Newberry MD  03/13/24 0661

## 2024-03-13 NOTE — ED NOTES
"Pacing in room expressing frustration. States she wants to leave. \"I want my phone, I want a shower, I want my things!\" Asking for daughter or sister who are not here at this time. States she can not sleep with a camera on her and does not want medication to help her relax. Md aware-pt informed she will be placed on a 72 hour hold is she attempts to walk out  "

## 2024-03-13 NOTE — TELEPHONE ENCOUNTER
S: [facility] ED , DEC  Мария  calling at 7PM about a 48 year old/Female presenting with paranoia and delusional.      B: Pt arrived via Family. Presenting problem, stressors: Pt was BIB family.  She was seen over the weekend as well in the ER for concerns of her mental status changes.  She has been acting confused, paranoid, delusional today.  Family is fearful that she is unable to care for herself.  She is a concern for safety at this time. She is able to participate in the assessment.  She has an acupuncture treatment that made her ultra sensitive.  For the past 7 days, she has been experiencing every sensation and fiber in her body.  She is in the state of being aware.  Family reports that she has been calling them in the middle of the night not sleeping.  She is paranoid taht people are listening and following her.  People are listening to her conversations.  ED MD shared that family has concerns taht Pt is refusing to come into the ED and that family reprots Pt could run into traffic.  Pt is unaware of her surroundings and do not want her to drive.  Pt thinks the drone is fallowing her.     Pt affect in ED:  appropriate, cooperative, does not want to stay, tearful at times, cooperative with interview.  Feels she is being forced against her will to stay.    Pt Dx: Bipolar Disorder  Previous IPMH hx? No.  Pt denies ever having to stay in the hospital for mh inpt.  She has had 72HH before.  She has not been taking her thyroid medicines.   Pt denies SI   Hx of suicide attempt? No  Pt denies SIB  Pt denies HI   Pt denies hallucinations .  She is delusional and paranoid.   Pt RARS Score: 2    Hx of aggression/violence, sexual offenses, legal concerns, Epic care plan? describe: None  Current concerns for aggression this visit? No  Does pt have a history of Civil Commitment? No  Is Pt their own guardian? Yes    Pt is not prescribed medication. Is patient medication compliant? N/A  Pt denies OP services   CD  concerns: Actively using/consuming marijuana.  Occasional use.  Acute or chronic medical concerns: Thyroid- she is not taking her meds  Does Pt present with specific needs, assistive devices, or exclusionary criteria? None      Pt is ambulatory  Pt is able to perform ADLs independently      A: Pt to be reviewed for [facility] admission. Pt is on a 72HH, initiated 3/12/24 at 12:33pm.  Preferred placement: Statewide    COVID Symptoms: No  If yes, COVID test required   Utox: Positive for cannabis.    CMP: WNL  CBC: WNL  HCG: Not ordered, intake to request lab     R: Patient cleared and ready for behavioral bed placement: Yes  Pt placed on [facility] worklist? Yes    Does Patient need a Transfer Center request created? Yes, writer completed Transfer Center request at:      R: [place]  Access Inpatient Bed Call Log 3/12/24 @9:15PM:     Intake has called facilities that have not updated the bed status within the last 12 hours.                               [facility] is at capacity            [facility] is posting 0 beds. [phone]    [facility] is posting 2 beds. Negative covid required   Per Basong, they are at capacity.            [facility] is posting 0 beds. Neg covid. No high school/Matilde-psych. [phone] Per call @8:29am, will no more after 830a meeting. Would need to be low acuity only if they can review.   [facility] is posting 0 beds. [phone]   [facility] is posting 0 beds. [phone]    [facility] is posting 2 beds for Young Adults. Negative covid. [phone] Per call @8:14 am   [facility] is posting 0 beds           [facility] is posting 1 bed [phone] -Per Basong, they are at capacity.       [facility] is posting 0 beds. LOW acuity ONLY. Mixed unit 12+. Negative covid- [phone]   [facility] has 5 beds posted. No aggression. Negative Covid. Low acuity    [facility] is posting 0 beds. Negative covid. [phone]    [facility]  ([place]) is posting 0 beds. Low acuity only. Neg covid.  [phone]    [facility] is posting 3 bed. Low acuity. No current aggression.   At capacity, per Neda.   [facility] ([place] is posting 3 beds available. Negative covid.  [phone].   Low acuity only.     [facility] is posting 0 beds. Low acuity. 72 HH hold preferred. Negative covid required. [phone] Per call @8:26am   [facility] ([place]) is posting 5 beds. Low acuity only. Neg covid.  [phone]    [facility] [place] is posting 2 beds.  Negative covid required.   Vol only, No history of aggression, violence, or assault. No sexual offenders. No 72 HH holds. [phone]        [facility] is posting 3 beds. Negative covid required.  (Must have the cognitive ability to do programming. No aggressive or violent behavior or recent HX in the last 2 yrs. MH must be primary.) Always low acuity. [phone]    [facility] has 0 beds posted. Negative covid required.  Low acuity only. Violence and aggression capped.  [phone]    [facility] is posting 2 beds. Low acuity, Negative covid required. [phone]    [facility] posting 0 beds Negative covid required.  [phone]    [facility] [place] is posting 2 bed. Negative covid. LOW acuity. (No lines, drains, or tubes, oxygen, CPAP, IV, etc.) Must Have a Ride Home. [phone]    [facility] is posting 2 beds. Negative covid. (No. lines, drains, or tubes, oxygen, CPAP, IV, etc.). [phone]      Pt remains on the work list pending appropriate bed availability.

## 2024-03-13 NOTE — ED NOTES
Talked with Ana Paula, pt's sister, re: bed placement in [place]-patient must walk-in voluntarily to facility. Ana Paula also requested doctor's note sent to patient's work. Note faxed to Chrissy at [phone] and note added to scan to chart.     Pt upstairs to shower with watcher and security.

## 2024-03-13 NOTE — ED PROVIDER NOTES
Assumed care at change of shift from Dr. Newberry.  See his note for patient presenting details and initial workup.  Briefly, this is a 48-year-old female who presented with sister and daughter over concern of psychosis and delusional disorder.  Has past medical history of bipolar disorder but does not take any medication.  Sister had reported patient wandering into neighbors yards and picking things up, putting them on porches.  She was brought to the hospital in [place] 2 days ago.  Noted to have significant hypothyroidism but patient was not taking any medication.  Tried to admit patient to a voluntary psych unit, but she refused to go inside.  She then drove up to her daughters home, here in [place].  They brought her to the emergency room here because Ellie was tangential, incoherent, and delusional and they felt she was unsafe.  Placed on a health officer hold initially.  This has run out, and feel the patient would be holdable, but she has not tried to elope from the department.  Needs frequent redirection, as she does not feel that she needs psychiatric help.  Is not suicidal or homicidal.  Had been assessed by DEC, and they recommended inpatient placement.  No appropriate beds have yet been found.  Health officer hold has run out, but again she has not tried to leave the department, but she would be holdable if she should try to do so.  She is refusing to take any medication    5:39 PM RN noted that sister had called.  She was requesting a note for [employer] work.  Note was written to excuse Ellie for 1 week stating she is under medical care and will need to be released by a multidisciplinary team.  This was faxed to her employer.    Signout at shift change to Dr. Newberry, who is familiar with the patient.  Continue to await appropriate inpatient bed.  Patient remained stable.     Neris Crockett, DO  03/13/24 0343

## 2024-03-13 NOTE — TELEPHONE ENCOUNTER
Pt is voluntary at this time.  Requested [facility] clarify where she is willing to be placed.      R: [place]  [place] Inpatient Bed Call Log 3/13/24 @ 3 pm  Intake has called facilities that have not updated the bed status within the last 12 hours.                               [facility] is at capacity            [facility] is posting 0 beds. [phone]    [facility] is posting 2 beds. Negative covid required              [facility] is posting 0 beds. Neg covid. No high school/Matilde-psych.    [facility] is posting 0 beds. [phone]   [facility] is posting 0 beds. [phone]    [facility] is posting 2 beds for Young Adults. Negative covid. [phone] Per call @8:14 am  beds not avlb.  [facility] is posting 0 beds           [facility] is posting 0 bed [phone]       [facility] is posting 1 beds. LOW acuity ONLY. Mixed unit 12+. Negative covid- [phone]   [facility] has 0 beds posted. No aggression. Negative Covid. Low acuity    [facility] is posting 0 beds. Negative covid. [phone]    [facility] is posting 0 beds. Low acuity only. Neg covid.  [phone]    [facility] is posting 3 bed. Low acuity. No current aggression.     [facility] ([place] is posting 3 beds available. Negative covid.  [phone].     MOOD D/O only    [facility] is posting 0 beds. Low acuity. 72 HH hold preferred. Negative covid required. [phone] Per call @8:26am   [facility] ([place]) is posting 4 beds. Low acuity only. Neg covid.  [phone]    [facility] [place] is posting 4 beds.  Negative covid required.   Vol only, No history of aggression, violence, or assault. No sexual offenders. No 72 HH holds. [phone]        [facility] is posting 3 beds. Negative covid required.  (Must have the cognitive ability to do programming. No  aggressive or violent behavior or recent HX in the last 2 yrs. MH must be primary.) Always low acuity. [phone]    [facility] has 0 beds posted. Negative covid required.  Low acuity only. Violence and aggression capped.  [phone]    [facility] is posting 2 beds. Low acuity, Negative covid required. [phone]    [facility] posting 0 beds Negative covid required.  [phone]    [facility] [place] is posting 2 bed. Negative covid. LOW acuity. (No lines, drains, or tubes, oxygen, CPAP, IV, etc.) Must Have a Ride Home. [phone]    [facility] is posting 4 beds. Negative covid. (No. lines, drains, or tubes, oxygen, CPAP, IV, etc.). [phone]    [facility] is posting 6 beds. No covid test required. [phone]     Pt remains on the work list pending appropriate bed availability.

## 2024-03-13 NOTE — ED NOTES
Pt sleeping, drank Pepsi and appears in happier mood, requesting shower; pt understands she will be able to shower when a room is available.

## 2024-03-14 NOTE — TELEPHONE ENCOUNTER
11:27 PM    No word yet from [facility] as to where pt should be placed,statewide v. [facility].    R: [facility] Access Inpatient Bed Call Log 3/13/2024 11:00 PM   Intake has called facilities that have not updated their bed status within the last 12 hours.??         *[facility]: @ cap per website.   [place] -- [facility]:  @ cap per website. [phone] ECT Tx offered.   [place] -- [facility]: Posting 0 beds. ECT Tx offered.        [place] -- [facility]:  @ cap per website.   [place] -- [facility]: Posting 0 beds.   [facility]: Posting 0 beds. [phone] ECT Tx offered.   [place]/ beds Posting 1 bed. Pt must be 16 and older.  [place] [facility] Posting 0 beds. [phone]     [place] -- [facility]: @ cap per website.   [place] [facility]:  Posting 0 beds. [phone].       [facility]: Posting 3 beds. Mixed unit/Low acuity only.  [phone]   [facility]: Posting 3 beds.  Low acuity, No aggression    [facility]: @ cap per website ECT Tx offered [phone]   [facility]:  Posting 0 beds. COVID negative test req. Lower acuity only [phone]  [facility]:  Posting 2 beds. Low acuity only. No current aggression.   [facility]: Posting 3 beds. Low acuity. ECT Tx offered @ [place] site ONLY   [phone] [facility]:  Posting 0 beds. NO reviews after 10PM. Low acuity only.    [facility]: Posting 5 beds. Low acuity only.   [facility]: Posting 4 beds. No hx of aggression, sexual offense. [phone]     [facility]:  Posting 3 beds. Low acuity only. Must have the cognitive ability to do programming. No aggressive or violent behavior or recent HX. [phone]   [facility] [place]: Posting 0 beds. COVID negative test. Must be low acuity ONLY. [phone]    [facility]: Posting 2 beds. Low acuity. Negative  Covid. [phone].   [place] [facility] [place]: Posting 0 beds. COVID negative test. [phone]   [facility]: Posting 2 beds. No hx of aggression/assault. No lines, drains or tubes. Must have a ride home. [phone]   [facility]: Posting 3 beds. Mixed unit/Low acuity/no medical devices - Negative Covid. [phone]    [facility] [place]: Posting 8 beds. Out-of-State Facility. [phone]     Pt remains on waitlist pending appropriate placement availability.